# Patient Record
Sex: MALE | Race: BLACK OR AFRICAN AMERICAN | NOT HISPANIC OR LATINO | URBAN - METROPOLITAN AREA
[De-identification: names, ages, dates, MRNs, and addresses within clinical notes are randomized per-mention and may not be internally consistent; named-entity substitution may affect disease eponyms.]

---
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
## 2024-05-01 NOTE — ED CDU PROVIDER DISPOSITION NOTE - NS ED MD DISPO ADMITTING SERVICE
Sammi calling back, wishing for prescription to be sent to [facility] as it is less expensive. Writer advised prescription may be delayed in this case and we would prefer pt to start med sooner than later. Fiorella requesting to mail order, prescription updated.    MED

## 2024-10-14 NOTE — ED ADULT TRIAGE NOTE - AS TEMP SITE
Body Location Override (Optional - Billing Will Still Be Based On Selected Body Map Location If Applicable): right upper back Detail Level: Detailed Add 41962 Cpt? (Important Note: In 2017 The Use Of 73648 Is Being Tracked By Cms To Determine Future Global Period Reimbursement For Global Periods): yes Body Location Override (Optional - Billing Will Still Be Based On Selected Body Map Location If Applicable): right mid back Detail Level: Simple oral